# Patient Record
Sex: MALE | Race: BLACK OR AFRICAN AMERICAN | NOT HISPANIC OR LATINO | Employment: OTHER | URBAN - METROPOLITAN AREA
[De-identification: names, ages, dates, MRNs, and addresses within clinical notes are randomized per-mention and may not be internally consistent; named-entity substitution may affect disease eponyms.]

---

## 2017-09-08 ENCOUNTER — HOSPITAL ENCOUNTER (EMERGENCY)
Facility: HOSPITAL | Age: 57
Discharge: HOME | End: 2017-09-09
Attending: EMERGENCY MEDICINE
Payer: MEDICAID

## 2017-09-08 DIAGNOSIS — F10.10 ALCOHOL ABUSE: ICD-10-CM

## 2017-09-08 DIAGNOSIS — F10.920 ACUTE ALCOHOL INTOXICATION, UNCOMPLICATED: Primary | ICD-10-CM

## 2017-09-08 LAB — POCT GLUCOSE: 84 MG/DL (ref 70–110)

## 2017-09-08 PROCEDURE — 99284 EMERGENCY DEPT VISIT MOD MDM: CPT | Mod: 25

## 2017-09-08 PROCEDURE — 82962 GLUCOSE BLOOD TEST: CPT

## 2017-09-08 PROCEDURE — 99283 EMERGENCY DEPT VISIT LOW MDM: CPT | Mod: ,,, | Performed by: EMERGENCY MEDICINE

## 2017-09-09 VITALS
HEIGHT: 63 IN | WEIGHT: 140 LBS | TEMPERATURE: 98 F | HEART RATE: 68 BPM | OXYGEN SATURATION: 100 % | BODY MASS INDEX: 24.8 KG/M2 | SYSTOLIC BLOOD PRESSURE: 120 MMHG | DIASTOLIC BLOOD PRESSURE: 80 MMHG | RESPIRATION RATE: 19 BRPM

## 2017-09-09 NOTE — ED PROVIDER NOTES
Encounter Date: 9/8/2017    SCRIBE #1 NOTE: I, Amna Trejo, am scribing for, and in the presence of, Dr. Melchor. the Resident attestation.       History     Chief Complaint   Patient presents with    Alcohol Intoxication     Found on private property intoxicated. Pt states he drank 2 bottles of liquior     57yo M BIBEMS after being found inebriated on public property. Pt endorses drinking multiple bottles of liquor, denies drug use. Uncertain LOC, no HA or neck pain. No visible or reported trauma. Pt protecting airway, following commands +slurred speech, +AOB. No current complaints, requesting a sandwich. Denies recent illness. Has presented with similar complaints for alcohol intoxication, most recently last month 8/3.    Accucheck 84        The history is provided by the patient.     Review of patient's allergies indicates:  No Known Allergies  Past Medical History:   Diagnosis Date    ETOH abuse     Hep C w/o coma, chronic     Hypertension      History reviewed. No pertinent surgical history.  History reviewed. No pertinent family history.  Social History   Substance Use Topics    Smoking status: Current Every Day Smoker    Smokeless tobacco: Never Used    Alcohol use Yes     Review of Systems   Constitutional: Negative for activity change, appetite change, chills, diaphoresis, fatigue and fever.   HENT: Positive for voice change (slurred). Negative for congestion, ear pain, mouth sores, rhinorrhea, sinus pain, sinus pressure, sneezing, sore throat and trouble swallowing.    Eyes: Negative for photophobia, pain and visual disturbance.   Respiratory: Negative for cough, choking, chest tightness, shortness of breath, wheezing and stridor.    Cardiovascular: Negative for chest pain, palpitations and leg swelling.   Gastrointestinal: Negative for abdominal distention, abdominal pain, constipation, diarrhea, nausea and vomiting.   Genitourinary: Negative for difficulty urinating, dysuria and flank pain.    Musculoskeletal: Negative for back pain, gait problem, neck pain and neck stiffness.   Skin: Negative for color change, pallor, rash and wound.   Neurological: Negative for dizziness, tremors, syncope, facial asymmetry, speech difficulty, weakness, light-headedness, numbness and headaches.   Psychiatric/Behavioral: Positive for decreased concentration. Negative for confusion, hallucinations and suicidal ideas. The patient is not nervous/anxious.        Physical Exam     Initial Vitals [09/08/17 2034]   BP Pulse Resp Temp SpO2   (!) 150/90 66 16 99 °F (37.2 °C) 98 %      MAP       110         Physical Exam    Nursing note and vitals reviewed.  Constitutional: He appears well-developed and well-nourished. He is not diaphoretic. No distress.   HENT:   Head: Normocephalic and atraumatic.   Right Ear: External ear normal.   Left Ear: External ear normal.   Nose: Nose normal.   Mouth/Throat: Oropharynx is clear and moist. No oropharyngeal exudate.   Eyes: Conjunctivae and EOM are normal. Pupils are equal, round, and reactive to light. Right eye exhibits no discharge. Left eye exhibits no discharge. No scleral icterus.   Neck: Normal range of motion. Neck supple. No thyromegaly present. No tracheal deviation present. No JVD present.   Cardiovascular: Normal rate, regular rhythm, normal heart sounds and intact distal pulses. Exam reveals no gallop and no friction rub.    No murmur heard.  Pulmonary/Chest: Breath sounds normal. No stridor. No respiratory distress. He has no wheezes. He has no rhonchi. He has no rales. He exhibits no tenderness.   Abdominal: Soft. Bowel sounds are normal. He exhibits no distension and no mass. There is no tenderness. There is no rebound and no guarding.   Musculoskeletal: Normal range of motion. He exhibits no edema or tenderness.   Lymphadenopathy:     He has no cervical adenopathy.   Neurological: He is alert and oriented to person, place, and time. He has normal strength and normal  reflexes. He displays no atrophy, no tremor and normal reflexes. No cranial nerve deficit or sensory deficit. He exhibits normal muscle tone. He displays no seizure activity. GCS eye subscore is 4. GCS verbal subscore is 5. GCS motor subscore is 6.   +AOB slurred speech, no nystagmus   Skin: Skin is warm and dry. No rash and no abscess noted. No erythema. No pallor.   Psychiatric: He has a normal mood and affect. Judgment and thought content normal. His mood appears not anxious. His affect is not angry, not blunt, not labile and not inappropriate. His speech is delayed and slurred. His speech is not rapid and/or pressured and not tangential. He is slowed and withdrawn. He is not agitated, not aggressive, not hyperactive and not actively hallucinating. Thought content is not paranoid. Cognition and memory are impaired. He does not express impulsivity or inappropriate judgment. He does not exhibit a depressed mood. He expresses no homicidal and no suicidal ideation. He is communicative. He is inattentive.         ED Course   Procedures  Labs Reviewed   POCT GLUCOSE             Medical Decision Making:   History:   Old Medical Records: I decided to obtain old medical records.  Initial Assessment:   55yo M with h/o alcohol abuse presents BIBEMS clinically intoxicated with no evidence of trauma, euglycemia  Differential Diagnosis:   Intoxication, dehydration  Clinical Tests:   Lab Tests: Ordered and Reviewed  ED Management:  2127: Euglycemic, will give regular diet, will monitor for signs/sx of withdrawal will observe for clinical sobriety  0011: Pt no longer slurring speech, able to stand and ambulate without assistance, pt stable for discharge to self care.    Tripp Dela Cruz MD, MPH  Emergency Med/Pediatrics PGY-4  9/8/2017               Scribe Attestation:   Scribe #1: I performed the above scribed service and the documentation accurately describes the services I performed. I attest to the accuracy of the  note.    Attending Attestation:   Physician Attestation Statement for Resident:  As the supervising MD   Physician Attestation Statement: I have personally seen and examined this patient.   I agree with the above history. -: Emergent evaluation of 56 y.o male with hx of alcohol abuse presents with acute intoxication. Vital signs stable. No evidence of trama. Will monitor until clinically sober.   As the supervising MD I agree with the above PE.    As the supervising MD I agree with the above treatment, course, plan, and disposition.   -: Patient observed in the ED for several hours.  He is currently able to attend to a normal conversation and ambulate with steady gait.  Patient stable for discharge  I have reviewed and agree with the residents interpretation of the following: lab data.          Physician Attestation for Scribe:  Physician Attestation Statement for Scribe #1: I, Dr. Melchor, reviewed documentation, as scribed by Amna Trejo in my presence, and it is both accurate and complete.                 ED Course      Clinical Impression:   The primary encounter diagnosis was Acute alcohol intoxication, uncomplicated. A diagnosis of Alcohol abuse was also pertinent to this visit.    Disposition:   Disposition: Discharged  Condition: Stable                        Benedict Dela Cruz MD  Resident  09/11/17 0149       Corrie Melchor MD  09/12/17 0436

## 2017-09-09 NOTE — ED TRIAGE NOTES
Alcohol intoxation. Pt states he's been drinking all day and somebody wants to kill him. Denies chestpain, nasuea and vomiting      LOC: The patient is awake, alert, aware of environment with an appropriate affect. Oriented x4, speaking appropriately  APPEARANCE: Pt resting comfortably, in no acute distress, pt is clean and well groomed, clothing properly fastened  SKIN:The skin is warm and dry, color consistent with ethnicity, patient has normal skin turgor and moist mucus membranes,  RESPIRATORY:Airway is open and patent, respirations are spontaneous, patient has a normal effort and rate, no accessory muscle use noted.  CARDIAC: Normal rate and rhythm, no peripheral edema noted, capillary refill < 3 seconds, bilateral radial pulses 2+.  ABDOMEN: Soft, non tender, non distended. Bowel sounds present x 4 quadrants.   NEUROLOGIC: PERRLA, facial expression is symmetrical, patient moving all extremities spontaneously, normal sensation in all extremities when touched with a finger.  Follows all commands appropriately  MUSCULOSKELETAL: Patient moving all extremities spontaneously, no obvious swelling or deformities noted.

## 2018-09-26 DIAGNOSIS — M79.605 LEFT LEG PAIN: Primary | ICD-10-CM

## 2018-09-26 DIAGNOSIS — M54.42 ACUTE BACK PAIN WITH SCIATICA, LEFT: ICD-10-CM

## 2018-09-26 DIAGNOSIS — M16.12 PRIMARY OSTEOARTHRITIS OF LEFT HIP: ICD-10-CM

## 2022-05-16 DIAGNOSIS — K74.69 OTHER CIRRHOSIS OF LIVER: Primary | ICD-10-CM

## 2024-04-25 DIAGNOSIS — F17.200 TOBACCO USE DISORDER: Primary | ICD-10-CM

## 2024-05-29 ENCOUNTER — OFFICE VISIT (OUTPATIENT)
Dept: UROLOGY | Facility: CLINIC | Age: 64
End: 2024-05-29
Payer: MEDICARE

## 2024-05-29 VITALS
HEART RATE: 72 BPM | HEIGHT: 63 IN | SYSTOLIC BLOOD PRESSURE: 141 MMHG | WEIGHT: 140 LBS | DIASTOLIC BLOOD PRESSURE: 81 MMHG | BODY MASS INDEX: 24.8 KG/M2

## 2024-05-29 DIAGNOSIS — N52.9 ERECTILE DYSFUNCTION, UNSPECIFIED ERECTILE DYSFUNCTION TYPE: Primary | ICD-10-CM

## 2024-05-29 PROCEDURE — 99213 OFFICE O/P EST LOW 20 MIN: CPT | Mod: S$PBB,,, | Performed by: UROLOGY

## 2024-05-29 PROCEDURE — 99213 OFFICE O/P EST LOW 20 MIN: CPT | Mod: PBBFAC | Performed by: UROLOGY

## 2024-05-29 PROCEDURE — 99999 PR PBB SHADOW E&M-EST. PATIENT-LVL III: CPT | Mod: PBBFAC,,, | Performed by: UROLOGY

## 2024-05-29 RX ORDER — QUETIAPINE FUMARATE 400 MG/1
1 TABLET, FILM COATED ORAL NIGHTLY
COMMUNITY
Start: 2024-02-07

## 2024-05-29 RX ORDER — LISINOPRIL AND HYDROCHLOROTHIAZIDE 12.5; 2 MG/1; MG/1
1 TABLET ORAL DAILY
COMMUNITY
Start: 2024-02-07 | End: 2025-02-06

## 2024-05-29 RX ORDER — IBUPROFEN 600 MG/1
600 TABLET ORAL 3 TIMES DAILY PRN
COMMUNITY
Start: 2024-02-12

## 2024-05-29 RX ORDER — AMLODIPINE BESYLATE 10 MG/1
1 TABLET ORAL DAILY
COMMUNITY
Start: 2024-02-07

## 2024-05-29 RX ORDER — OXCARBAZEPINE 300 MG/1
300 TABLET, FILM COATED ORAL DAILY
COMMUNITY

## 2024-05-29 RX ORDER — ATORVASTATIN CALCIUM 40 MG/1
1 TABLET, FILM COATED ORAL DAILY
COMMUNITY
Start: 2024-02-07 | End: 2025-02-06

## 2024-05-29 RX ORDER — PAPAVERINE HYDROCHLORIDE 30 MG/ML
INJECTION INTRAMUSCULAR; INTRAVENOUS
Qty: 10 ML | Refills: 11 | Status: SHIPPED | OUTPATIENT
Start: 2024-05-29

## 2024-05-29 NOTE — PROGRESS NOTES
Subjective:       Patient ID: Sergey Hoffman is a 63 y.o. male.    Chief Complaint: Erectile Dysfunction (Pt here for ed, he states this has been a problem since his 30's. Pt reports having tried cialis and viagra; they did not adequately resolve his symptoms. )    HPI  patient has erectile dysfunction he has failed Viagra and Cialis he would like to discuss other treatment methods we talked about everything and he would like to learn Pap injections will having see Coretta Pineda to learn dried up in how to inject it and how much use etcetera    Past Medical History:   Diagnosis Date    ETOH abuse     Hep C w/o coma, chronic     Hypertension        No past surgical history on file.    No family history on file.    Social History     Socioeconomic History    Marital status: Unknown   Tobacco Use    Smokeless tobacco: Never   Substance and Sexual Activity    Alcohol use: Yes   Social History Narrative    ** Merged History Encounter **          Social Determinants of Health     Financial Resource Strain: Declined (2023)    Received from [facility]     Financial Resource Strain     Financial Resource Strain: 99   Food Insecurity: Not on File (2022)    Received from [facility]     Food Insecurity     Food: 0   Transportation Needs: Not at Risk (2023)    Received from [facility]     Transportation Needs     Transportation: 1   Physical Activity: Not on File (2022)    Received from [facility]     Physical Activity     Physical Activity: 0   Stress: Not at Risk (2023)    Received from [facility]     Stress     Stress: 1   Housing Stability: Not at Risk (2023)    Received from [facility]     Housing Stability     Housin       Allergies:  Patient has no known allergies.    Medications:    Current Outpatient Medications:     amLODIPine (NORVASC) 10 MG tablet, Take 1 tablet by mouth once daily., Disp: , Rfl:     atorvastatin (LIPITOR) 40 MG tablet, Take 1 tablet by mouth once daily., Disp: , Rfl:     ibuprofen  (ADVIL,MOTRIN) 600 MG tablet, Take 600 mg by mouth 3 (three) times daily as needed., Disp: , Rfl:     lisinopriL-hydrochlorothiazide (PRINZIDE,ZESTORETIC) 20-12.5 mg per tablet, Take 1 tablet by mouth once daily., Disp: , Rfl:     QUEtiapine (SEROQUEL) 400 MG tablet, Take 1 tablet by mouth every evening., Disp: , Rfl:     OXcarbazepine (TRILEPTAL) 300 MG Tab, Take 300 mg by mouth once daily., Disp: , Rfl:     Review of Systems    Objective:      Physical Exam    Assessment:       1. Erectile dysfunction, unspecified erectile dysfunction type        Plan:       Sergey was seen today for erectile dysfunction.    Diagnoses and all orders for this visit:    Erectile dysfunction, unspecified erectile dysfunction type     Appointment with Coretta Pineda already Pap injections I explained to him how it works and gave him prescription for Pap  he will call and make an appointment to see Coretta call us if he has any problems